# Patient Record
Sex: MALE | Race: WHITE | NOT HISPANIC OR LATINO
[De-identification: names, ages, dates, MRNs, and addresses within clinical notes are randomized per-mention and may not be internally consistent; named-entity substitution may affect disease eponyms.]

---

## 2019-12-22 ENCOUNTER — TRANSCRIPTION ENCOUNTER (OUTPATIENT)
Age: 74
End: 2019-12-22

## 2021-09-22 ENCOUNTER — INPATIENT (INPATIENT)
Facility: HOSPITAL | Age: 76
LOS: 0 days | Discharge: HOME | DRG: 74 | End: 2021-09-23
Attending: HOSPITALIST | Admitting: HOSPITALIST
Payer: MEDICARE

## 2021-09-22 VITALS — HEIGHT: 66 IN | WEIGHT: 199.96 LBS

## 2021-09-22 DIAGNOSIS — I63.9 CEREBRAL INFARCTION, UNSPECIFIED: ICD-10-CM

## 2021-09-22 LAB
ALBUMIN SERPL ELPH-MCNC: 3.8 G/DL — SIGNIFICANT CHANGE UP (ref 3.3–5)
ALP SERPL-CCNC: 81 U/L — SIGNIFICANT CHANGE UP (ref 40–120)
ALT FLD-CCNC: 25 U/L — SIGNIFICANT CHANGE UP (ref 12–78)
ANION GAP SERPL CALC-SCNC: 5 MMOL/L — SIGNIFICANT CHANGE UP (ref 5–17)
APPEARANCE UR: CLEAR — SIGNIFICANT CHANGE UP
APTT BLD: 32 SEC — SIGNIFICANT CHANGE UP (ref 27.5–35.5)
AST SERPL-CCNC: 26 U/L — SIGNIFICANT CHANGE UP (ref 15–37)
BASOPHILS # BLD AUTO: 0.03 K/UL — SIGNIFICANT CHANGE UP (ref 0–0.2)
BASOPHILS NFR BLD AUTO: 0.6 % — SIGNIFICANT CHANGE UP (ref 0–2)
BILIRUB SERPL-MCNC: 0.5 MG/DL — SIGNIFICANT CHANGE UP (ref 0.2–1.2)
BILIRUB UR-MCNC: NEGATIVE — SIGNIFICANT CHANGE UP
BUN SERPL-MCNC: 18 MG/DL — SIGNIFICANT CHANGE UP (ref 7–23)
CALCIUM SERPL-MCNC: 8.7 MG/DL — SIGNIFICANT CHANGE UP (ref 8.5–10.1)
CHLORIDE SERPL-SCNC: 107 MMOL/L — SIGNIFICANT CHANGE UP (ref 96–108)
CO2 SERPL-SCNC: 28 MMOL/L — SIGNIFICANT CHANGE UP (ref 22–31)
COLOR SPEC: YELLOW — SIGNIFICANT CHANGE UP
CREAT SERPL-MCNC: 1.01 MG/DL — SIGNIFICANT CHANGE UP (ref 0.5–1.3)
DIFF PNL FLD: NEGATIVE — SIGNIFICANT CHANGE UP
EOSINOPHIL # BLD AUTO: 0.12 K/UL — SIGNIFICANT CHANGE UP (ref 0–0.5)
EOSINOPHIL NFR BLD AUTO: 2.4 % — SIGNIFICANT CHANGE UP (ref 0–6)
GLUCOSE SERPL-MCNC: 123 MG/DL — HIGH (ref 70–99)
GLUCOSE UR QL: NEGATIVE MG/DL — SIGNIFICANT CHANGE UP
HCT VFR BLD CALC: 44.8 % — SIGNIFICANT CHANGE UP (ref 39–50)
HGB BLD-MCNC: 14.1 G/DL — SIGNIFICANT CHANGE UP (ref 13–17)
IMM GRANULOCYTES NFR BLD AUTO: 0.6 % — SIGNIFICANT CHANGE UP (ref 0–1.5)
INR BLD: 0.96 RATIO — SIGNIFICANT CHANGE UP (ref 0.88–1.16)
KETONES UR-MCNC: NEGATIVE — SIGNIFICANT CHANGE UP
LEUKOCYTE ESTERASE UR-ACNC: NEGATIVE — SIGNIFICANT CHANGE UP
LYMPHOCYTES # BLD AUTO: 1.39 K/UL — SIGNIFICANT CHANGE UP (ref 1–3.3)
LYMPHOCYTES # BLD AUTO: 27.4 % — SIGNIFICANT CHANGE UP (ref 13–44)
MANUAL SMEAR VERIFICATION: SIGNIFICANT CHANGE UP
MCHC RBC-ENTMCNC: 23 PG — LOW (ref 27–34)
MCHC RBC-ENTMCNC: 31.5 GM/DL — LOW (ref 32–36)
MCV RBC AUTO: 73.2 FL — LOW (ref 80–100)
MONOCYTES # BLD AUTO: 0.47 K/UL — SIGNIFICANT CHANGE UP (ref 0–0.9)
MONOCYTES NFR BLD AUTO: 9.3 % — SIGNIFICANT CHANGE UP (ref 2–14)
NEUTROPHILS # BLD AUTO: 3.03 K/UL — SIGNIFICANT CHANGE UP (ref 1.8–7.4)
NEUTROPHILS NFR BLD AUTO: 59.7 % — SIGNIFICANT CHANGE UP (ref 43–77)
NITRITE UR-MCNC: NEGATIVE — SIGNIFICANT CHANGE UP
PH UR: 5 — SIGNIFICANT CHANGE UP (ref 5–8)
PLAT MORPH BLD: NORMAL — SIGNIFICANT CHANGE UP
PLATELET # BLD AUTO: 223 K/UL — SIGNIFICANT CHANGE UP (ref 150–400)
POTASSIUM SERPL-MCNC: 4 MMOL/L — SIGNIFICANT CHANGE UP (ref 3.5–5.3)
POTASSIUM SERPL-SCNC: 4 MMOL/L — SIGNIFICANT CHANGE UP (ref 3.5–5.3)
PROT SERPL-MCNC: 7.3 GM/DL — SIGNIFICANT CHANGE UP (ref 6–8.3)
PROT UR-MCNC: NEGATIVE MG/DL — SIGNIFICANT CHANGE UP
PROTHROM AB SERPL-ACNC: 11.2 SEC — SIGNIFICANT CHANGE UP (ref 10.6–13.6)
RBC # BLD: 6.12 M/UL — HIGH (ref 4.2–5.8)
RBC # FLD: 16.8 % — HIGH (ref 10.3–14.5)
RBC BLD AUTO: SIGNIFICANT CHANGE UP
SODIUM SERPL-SCNC: 140 MMOL/L — SIGNIFICANT CHANGE UP (ref 135–145)
SP GR SPEC: 1.01 — SIGNIFICANT CHANGE UP (ref 1.01–1.02)
TROPONIN I SERPL-MCNC: <0.015 NG/ML — SIGNIFICANT CHANGE UP (ref 0.01–0.04)
UROBILINOGEN FLD QL: NEGATIVE MG/DL — SIGNIFICANT CHANGE UP
WBC # BLD: 5.07 K/UL — SIGNIFICANT CHANGE UP (ref 3.8–10.5)
WBC # FLD AUTO: 5.07 K/UL — SIGNIFICANT CHANGE UP (ref 3.8–10.5)

## 2021-09-22 PROCEDURE — U0003: CPT

## 2021-09-22 PROCEDURE — 86618 LYME DISEASE ANTIBODY: CPT | Mod: 59

## 2021-09-22 PROCEDURE — 93005 ELECTROCARDIOGRAM TRACING: CPT

## 2021-09-22 PROCEDURE — 99285 EMERGENCY DEPT VISIT HI MDM: CPT

## 2021-09-22 PROCEDURE — 84484 ASSAY OF TROPONIN QUANT: CPT

## 2021-09-22 PROCEDURE — 36415 COLL VENOUS BLD VENIPUNCTURE: CPT

## 2021-09-22 PROCEDURE — 70551 MRI BRAIN STEM W/O DYE: CPT

## 2021-09-22 PROCEDURE — 70496 CT ANGIOGRAPHY HEAD: CPT | Mod: 26,MA

## 2021-09-22 PROCEDURE — 70498 CT ANGIOGRAPHY NECK: CPT | Mod: 26,MA

## 2021-09-22 PROCEDURE — 82746 ASSAY OF FOLIC ACID SERUM: CPT

## 2021-09-22 PROCEDURE — U0005: CPT

## 2021-09-22 PROCEDURE — 82607 VITAMIN B-12: CPT

## 2021-09-22 PROCEDURE — 71046 X-RAY EXAM CHEST 2 VIEWS: CPT | Mod: 26

## 2021-09-22 RX ORDER — VALACYCLOVIR 500 MG/1
1000 TABLET, FILM COATED ORAL ONCE
Refills: 0 | Status: COMPLETED | OUTPATIENT
Start: 2021-09-22 | End: 2021-09-22

## 2021-09-22 RX ORDER — GLUCOSAMINE/CHONDROITIN/C/MANG 500-400 MG
1 CAPSULE ORAL
Qty: 0 | Refills: 0 | DISCHARGE

## 2021-09-22 RX ORDER — ASPIRIN/CALCIUM CARB/MAGNESIUM 324 MG
81 TABLET ORAL ONCE
Refills: 0 | Status: COMPLETED | OUTPATIENT
Start: 2021-09-22 | End: 2021-09-22

## 2021-09-22 RX ORDER — CHOLECALCIFEROL (VITAMIN D3) 125 MCG
1 CAPSULE ORAL
Qty: 0 | Refills: 0 | DISCHARGE

## 2021-09-22 RX ADMIN — Medication 81 MILLIGRAM(S): at 18:53

## 2021-09-22 RX ADMIN — Medication 40 MILLIGRAM(S): at 22:00

## 2021-09-22 RX ADMIN — VALACYCLOVIR 1000 MILLIGRAM(S): 500 TABLET, FILM COATED ORAL at 22:01

## 2021-09-22 NOTE — ED STATDOCS - ATTENDING CONTRIBUTION TO CARE
I, Booker Valdez, ED Attending,  performed the initial face to face bedside interview with this patient regarding history of present illness, review of symptoms and relevant past medical, social and family history.  I completed an independent physical examination.  I was the initial provider who evaluated this patient. I have signed out the follow up of any pending tests (i.e. labs, radiological studies) to the ACP.  I have communicated the patient’s plan of care and disposition with the ACP.  The history, relevant review of systems, past medical and surgical history, medical decision making, and physical examination was documented by the scribe in my presence and I attest to the accuracy of the documentation.

## 2021-09-22 NOTE — ED STATDOCS - PROGRESS NOTE DETAILS
75 y/o Male with HLD presents to ED c/o noticing funny sensation, like "novacaine wearing off", to right side of muoth / lips that started at 3pm yesterday.  Neg HTn, CAD, TIA, CVA in the past.  Neg associated HA, pain to one side of face, nausea, vomiting, rash, weakness to one side of body, unsteady gait.  Pt does report that he speech sounded garbles yesterday, but better today.  On exam, PERRLA, EOMS intact s nystagmus, Oropharynx pink s lesions.  (+) Mild decrease in naso-labial fold to left side of face.  Unable to blow off cheek on left side or show teeth on left side of face.  Sens intact to bilat face.  Speech clear.  Pt can raise eyebrow bilat, but R > left side.  Pt with difficulty closing left eye.  Discussed case with Dr. Lazaro.  Will FT her with pt when he returns from Imaging.  Sherron Rhodes PA-C DR. Lazaro performed a Consult over FT with patient in ED.  She cannot r/o CVA at this time.  Recommends that pt stay for MRI and official Neuro Consult in the morning.  She also said to start Prednisone and Valtrex as precaution.  left message for Hospitalist to call back for Admit / OBS.  Sherron Rhodes PA-C

## 2021-09-22 NOTE — ED STATDOCS - CLINICAL SUMMARY MEDICAL DECISION MAKING FREE TEXT BOX
sx more consistent with dysarthria and possible facial droop, outside window for code stroke as last known normal was 3PM yesterday, plan for stroke and infectious workup and neuro consult sx more consistent with dysarthria and possible facial droop, outside window for code stroke as last known normal was 3PM yesterday, plan for stroke and infectious workup and neuro consult    JAMIN Valdez, Attending: reviewed note.    ?R sided facial numbness and lip "sloppiness". Sx started 27-28 hours prior to presentation. No falls, trauma, pain or hx of CVA. Mother does have hx of bells palsy. Does report increased sensitivity to certain foods?    On exam, smile is asymmetrical. Appears LEFT corner of mouth elevating. NO obvious involvement of forehead. 5/5 strength in extremities. Normal memory. ?slight dysarthria.  Not code stroke due to not candidate for any immediate intervention. ASA ordered. Suspect atypical bells vs small CVA. Neuroimaging, neuro c/s and admission. sx more consistent with dysarthria and possible facial droop, outside window for code stroke as last known normal was 3PM yesterday, plan for stroke and infectious workup and neuro consult    JAMIN Valdez, Attending: reviewed note.    ?R sided facial numbness and lip "sloppiness". Sx started 27-28 hours prior to presentation. No falls, trauma, pain or hx of CVA. Mother does have hx of bells palsy. Does report increased sensitivity to certain foods?    On exam, smile is asymmetrical. Appears LEFT corner of mouth NOT elevating. NO obvious involvement of forehead. 5/5 strength in extremities. Normal memory. ?slight dysarthria.  Not code stroke due to not candidate for any immediate intervention. ASA ordered. Suspect atypical bells vs small CVA. Neuroimaging, neuro c/s and admission.

## 2021-09-22 NOTE — ED STATDOCS - OBJECTIVE STATEMENT
75 y/o male with no pertinent PMHx presents to ED c/o right sided facial numbness since yesterday. States yesterday he first noted numbness to right side of mouth and inability to control right side of mouth and then proceeded with right sided facial numbness. States recently he has increased sensitivity to bananas and strawberries. Denies any changes to extremities. Denies any trauma, no chest pain, no neck pain. Denies fevers, cough, sore throat. Vaccinated against COVID. 77 y/o male with no pertinent PMHx presents to ED c/o right sided facial numbness since yesterday. States yesterday he first noted numbness to right side of mouth and inability to control right side of mouth around 3PM and then proceeded with right sided facial numbness. States sx are improving. States he has increased sensitivity to bananas and strawberries. Denies any changes to extremities. Denies any trauma, no chest pain, no neck pain. Denies fevers, cough, sore throat. Vaccinated against COVID. PMD: Dr. Altamirano. 77 y/o male with no pertinent PMHx presents to ED c/o right sided facial numbness since yesterday. States yesterday at 3PM he first noted numbness to right side of mouth and inability to control right side of mouth and then proceeded with right sided facial numbness. States sx are improving. States he has increased sensitivity to bananas and strawberries. Denies any changes to extremities. Denies any trauma, no chest pain, no neck pain. Denies fevers, cough, sore throat. Vaccinated against COVID. PMD: Dr. Altamirano.

## 2021-09-22 NOTE — ED STATDOCS - NEUROLOGICAL, MLM
sensation is normal and strength is normal. sensation is normal and strength is normal. +asymmetrical smile, sparing of paralysis of forehead, ?dysarthria, no pronator drift, 5/5 strength in all extremities

## 2021-09-22 NOTE — ED ADULT TRIAGE NOTE - CHIEF COMPLAINT QUOTE
patient c/o facial swelling since last night.  patient reports onset of right sided facial swelling and intermittent numbness last night.  patient reports ice helped for a while.  patient reports times where whole mouth felt swollen. denies injury to area.  BEFAST negative.

## 2021-09-23 ENCOUNTER — TRANSCRIPTION ENCOUNTER (OUTPATIENT)
Age: 76
End: 2021-09-23

## 2021-09-23 VITALS
DIASTOLIC BLOOD PRESSURE: 63 MMHG | OXYGEN SATURATION: 98 % | RESPIRATION RATE: 18 BRPM | HEART RATE: 72 BPM | SYSTOLIC BLOOD PRESSURE: 136 MMHG | TEMPERATURE: 98 F

## 2021-09-23 LAB
B BURGDOR C6 AB SER-ACNC: NEGATIVE — SIGNIFICANT CHANGE UP
B BURGDOR IGG+IGM SER-ACNC: 0.1 INDEX — SIGNIFICANT CHANGE UP (ref 0.01–0.89)
COVID-19 SPIKE DOMAIN AB INTERP: POSITIVE
COVID-19 SPIKE DOMAIN ANTIBODY RESULT: >250 U/ML — HIGH
FOLATE SERPL-MCNC: 12.9 NG/ML — SIGNIFICANT CHANGE UP
SARS-COV-2 IGG+IGM SERPL QL IA: >250 U/ML — HIGH
SARS-COV-2 IGG+IGM SERPL QL IA: POSITIVE
SARS-COV-2 RNA SPEC QL NAA+PROBE: SIGNIFICANT CHANGE UP
TROPONIN I SERPL-MCNC: <0.015 NG/ML — SIGNIFICANT CHANGE UP (ref 0.01–0.04)
TROPONIN I SERPL-MCNC: <0.015 NG/ML — SIGNIFICANT CHANGE UP (ref 0.01–0.04)
VIT B12 SERPL-MCNC: 280 PG/ML — SIGNIFICANT CHANGE UP (ref 232–1245)

## 2021-09-23 PROCEDURE — 70551 MRI BRAIN STEM W/O DYE: CPT | Mod: 26

## 2021-09-23 PROCEDURE — 99223 1ST HOSP IP/OBS HIGH 75: CPT

## 2021-09-23 PROCEDURE — 93010 ELECTROCARDIOGRAM REPORT: CPT

## 2021-09-23 RX ORDER — CX-024414 0.2 MG/ML
0.5 INJECTION, SUSPENSION INTRAMUSCULAR
Qty: 0 | Refills: 0 | DISCHARGE

## 2021-09-23 RX ORDER — VALACYCLOVIR 500 MG/1
1000 TABLET, FILM COATED ORAL THREE TIMES A DAY
Refills: 0 | Status: DISCONTINUED | OUTPATIENT
Start: 2021-09-23 | End: 2021-09-23

## 2021-09-23 RX ORDER — ASPIRIN/CALCIUM CARB/MAGNESIUM 324 MG
81 TABLET ORAL DAILY
Refills: 0 | Status: DISCONTINUED | OUTPATIENT
Start: 2021-09-24 | End: 2021-09-23

## 2021-09-23 RX ORDER — ATORVASTATIN CALCIUM 80 MG/1
40 TABLET, FILM COATED ORAL AT BEDTIME
Refills: 0 | Status: DISCONTINUED | OUTPATIENT
Start: 2021-09-23 | End: 2021-09-23

## 2021-09-23 RX ORDER — CHOLECALCIFEROL (VITAMIN D3) 125 MCG
1000 CAPSULE ORAL DAILY
Refills: 0 | Status: DISCONTINUED | OUTPATIENT
Start: 2021-09-23 | End: 2021-09-23

## 2021-09-23 RX ORDER — ENOXAPARIN SODIUM 100 MG/ML
40 INJECTION SUBCUTANEOUS DAILY
Refills: 0 | Status: DISCONTINUED | OUTPATIENT
Start: 2021-09-23 | End: 2021-09-23

## 2021-09-23 RX ORDER — INFLUENZA VIRUS VACCINE 15; 15; 15; 15 UG/.5ML; UG/.5ML; UG/.5ML; UG/.5ML
0.5 SUSPENSION INTRAMUSCULAR ONCE
Refills: 0 | Status: DISCONTINUED | OUTPATIENT
Start: 2021-09-23 | End: 2021-09-23

## 2021-09-23 RX ADMIN — Medication 1000 UNIT(S): at 09:59

## 2021-09-23 RX ADMIN — ENOXAPARIN SODIUM 40 MILLIGRAM(S): 100 INJECTION SUBCUTANEOUS at 09:59

## 2021-09-23 NOTE — CONSULT NOTE ADULT - ASSESSMENT
77 yo man with left facial weakness.  What he thought was right facial swelling was likely relative to flattening of left face.  Forehead minimally affected.  Likely Bell's Palsy, but since forehead is not clearly affected, will get MRI brain. Spoke to MRI and this will be done this AM.  Received valacyclovir and Prednisone x 1. If MRI negative for stroke, would continue prednisone taper and valacyclovir.  Lubricating eye drops/gel to left eye.  Patching of left eye at night  if unable to fully close.  Check Lyme and ACE levels.  If MRI is positive for stroke will get echocardiogram.    Will follow up results of MRI.  Discussed with Dr. D'Amico

## 2021-09-23 NOTE — H&P ADULT - HISTORY OF PRESENT ILLNESS
CC:  Patient is a 76y old  Male who presents with a chief complaint of L facial weakness.    HPI:  76M.  presented from home to ED on 09/22/2021 c/o L facial weakness.  onset 09/21.  a/w sensation of "swelling" and mild difficulty w/ speech.  He was kept for observation as it was unclear if his forehead was spared suggesting stroke rather than Bell's Palsy.    in ED, NIHSS:  1.  initial neuroimaging negative for acute findings.  EKG sinus.  given ASA, valacyclovir and prednisone.  MR brain pending.    ROS:      all other review of systems are negative unless indicated above.    PAST MEDICAL & SURGICAL HISTORY:      Allergies    Allergy Status Unknown    Intolerances        Home Medications:  Artificial Tears ophthalmic solution: 1 drop(s) to each affected eye 2 times a day, As Needed - for dry eyes (22 Sep 2021 23:40)  Glucosamine Chondroitin oral capsule: 1 cap(s) orally once a day (22 Sep 2021 23:40)  Moderna COVID-19 Vaccine  mcg/0.5 mL intramuscular suspension: 0.5 milliliter(s) intramuscular once  ***2nd dose 4/6*** (22 Sep 2021 23:40)  Vitamin D3 25 mcg (1000 intl units) oral tablet: 1 tab(s) orally once a day (22 Sep 2021 23:40)      Social History:      FAMILY HISTORY:      Vital Signs Last 24 Hrs  T(C): 36.6 (23 Sep 2021 07:14), Max: 37.1 (22 Sep 2021 18:23)  T(F): 97.8 (23 Sep 2021 07:14), Max: 98.7 (22 Sep 2021 18:23)  HR: 64 (23 Sep 2021 07:14) (58 - 64)  BP: 118/79 (23 Sep 2021 07:14) (110/77 - 136/67)  BP(mean): 89 (23 Sep 2021 07:14) (89 - 89)  RR: 16 (23 Sep 2021 07:14) (16 - 18)  SpO2: 94% (23 Sep 2021 07:14) (94% - 96%)    Constitutional: NAD.   HEENT: PERRL, EOMI, MMM.  Neck: Soft and supple, No carotid bruit, No JVD  Respiratory: Breath sounds are clear bilaterally, No wheezing, rales or rhonchi  Cardiovascular: S1 and S2, regular rate and rhythm, no murmur, rub or gallop.  Gastrointestinal: Bowel Sounds present, soft, nontender, nondistended, no guarding, no rebound, no mass.  Extremities: No peripheral edema  Vascular: 2+ peripheral pulses  Neurological: A/O x 3,   facial sensation normal, Left facial weakness. More prominent on lower face. Forehead on left minimally affected. Blinking slower on left more than right but can fully close left eye  Musculoskeletal: 5/5 strength b/l upper and lower extremities  Skin:  no visible rashes.                             14.1   5.07  )-----------( 223      ( 22 Sep 2021 19:20 )             44.8       PT/INR - ( 22 Sep 2021 19:20 )   PT: 11.2 sec;   INR: 0.96 ratio         PTT - ( 22 Sep 2021 19:20 )  PTT:32.0 sec    09-22    140  |  107  |  18  ----------------------------<  123<H>  4.0   |  28  |  1.01    Ca    8.7      22 Sep 2021 19:20    TPro  7.3  /  Alb  3.8  /  TBili  0.5  /  DBili  x   /  AST  26  /  ALT  25  /  AlkPhos  81  09-22      LIVER FUNCTIONS - ( 22 Sep 2021 19:20 )  Alb: 3.8 g/dL / Pro: 7.3 gm/dL / ALK PHOS: 81 U/L / ALT: 25 U/L / AST: 26 U/L / GGT: x             CARDIAC MARKERS ( 23 Sep 2021 02:03 )  <0.015 ng/mL / x     / x     / x     / x      CARDIAC MARKERS ( 22 Sep 2021 23:20 )  <0.015 ng/mL / x     / x     / x     / x      CARDIAC MARKERS ( 22 Sep 2021 19:20 )  <0.015 ng/mL / x     / x     / x     / x

## 2021-09-23 NOTE — H&P ADULT - ASSESSMENT
76M.  presented from home to ED on 09/22/2021 c/o L facial weakness.    L facial weakness - DDx:  Bell's Palsy;  CVA.    - admit to:  telemetry [facility].  - labs:  A1C;  lipid panel;  B12/FA;  Lyme;  ACE levels.  - imaging:  follow MR brain.  - echocardiogram:  if MR (+) for CVA.  hold for now.  - AP:  ASA 81mg po qd.  - ST:  atorvastatin 40mg po qhs.  - PT.  - DVT prophylaxis:  LMWH.  - dysphagia screening and speech pathology evaluation.  - if no evidence for CVA, will start:  valacyclovir + prednisone.  - disposition:  plan to DC home if MR negative for CVA.  continue to 3N for now.  - communication:  ED RN;  Neurology.

## 2021-09-23 NOTE — DISCHARGE NOTE PROVIDER - CARE PROVIDER_API CALL
Mayte Lazaro (MD)  Clinical Neurophysiology; EEGEpilepsy; Neurology; Sleep Medicine  [address] [facility], [place] [address]  Phone: [phone]  Fax: [phone]  Follow Up Time: 1 week

## 2021-09-23 NOTE — DISCHARGE NOTE PROVIDER - HOSPITAL COURSE
76M.  presented from home to ED on 09/22/2021 c/o L facial weakness.    L facial weakness - DDx:  Bell's Palsy;  CVA.    - admit to:  telemetry [facility].  - labs:  A1C;  lipid panel;  B12/FA;  Lyme;  ACE levels.  - imaging:  follow MR brain.  - echocardiogram:  if MR (+) for CVA.  hold for now.  - AP:  ASA 81mg po qd.  - ST:  atorvastatin 40mg po qhs.  - PT.  - DVT prophylaxis:  LMWH.  - dysphagia screening and speech pathology evaluation.  - if no evidence for CVA, will start:  valacyclovir + prednisone.  - disposition:  plan to DC home if MR negative for CVA.  continue to 3N for now.  - communication:  ED RN;  Neurology.      Electronic Signatures for Addendum Section:   D'Amico, Thomas J (DO) (Signed Addendum 23-Sep-2021 13:11)    informed by RN MR was negative for stroke.  awaiting for official report.    anticipate DC home after above.  - prednisone 60mg po qd x 7 days.  - valacyclovir 1000mg po tid x 7 days.

## 2021-09-23 NOTE — ED ADULT NURSE REASSESSMENT NOTE - NS ED NURSE REASSESS COMMENT FT1
RN reached out to hospitalist on admit phone-spoke with Dr. Gomez. Pt asking when his "MRI will be". MRI never ordered by ER doc or neurologist although it is mentioned in previous notes. Pt is currently pending admission orders. RN requesting order be placed for MRI. Per Dr. Gomez "Pt will be seen by next shift in half an hour". No order placed.

## 2021-09-23 NOTE — DISCHARGE NOTE NURSING/CASE MANAGEMENT/SOCIAL WORK - PATIENT PORTAL LINK FT
You can access the FollowMyHealth Patient Portal offered by [facility] by registering at the following website: [email or web address]. By joining [facility]’s FollowMyHealth portal, you will also be able to view your health information using other applications (apps) compatible with our system.

## 2021-09-23 NOTE — SWALLOW BEDSIDE ASSESSMENT ADULT - COMMENTS
REQUEST FOR CONSULT RECEIVED. CHART REVIEWED. PT WAS D/C FROM HOSPITAL TODAY PRIOR TO BEING SEEN FOR CONSULT BY SPEECH PATHOLOGY. TURN AROUND TIME FROM THE TIME CONSULT WAS ORDERED TO THE TIME I ATTEMPTED TO SEE PT FOR EXAMINATION WAS LESS THAN 24 HOURS.

## 2021-09-23 NOTE — CONSULT NOTE ADULT - SUBJECTIVE AND OBJECTIVE BOX
Patient is a 76y old  Male who presents with a chief complaint of facial weakness.    HPI: Mr. Vides is a 76 year old man who noticed "right facial swelling" on 9/21. He also reported some difficulty with speech. He felt that he was speaking as if he had been at the dentist.  He also noticed some distortion of taste on 9/22.  He came to the ED on 9/22 and was found to have a right facial weakness.  He was kept for observation as it was unclear if his forehead was spared suggesting stroke rather than Bell's Palsy.  He denies any pain or facial numbness.  He denies any difficulty speaking or swallowing at this time.  He denies weakness in any other areas of the body.      PAST MEDICAL & SURGICAL HISTORY:  Borderline high cholesterol  Right hip replacement  Cholecystectomy      FAMILY HISTORY:  Mother - Bell's Palsy  Family history of alcoholism      Social Hx:  Nonsmoker, no drug or alcohol use    MEDICATIONS  (STANDING):  Vitamin D     Allergies    Allergy Status Unknown    Intolerances        ROS: Pertinent positives in HPI, all other ROS were reviewed and are negative.      Vital Signs Last 24 Hrs  T(C): 36.6 (23 Sep 2021 07:14), Max: 37.1 (22 Sep 2021 18:23)  T(F): 97.8 (23 Sep 2021 07:14), Max: 98.7 (22 Sep 2021 18:23)  HR: 64 (23 Sep 2021 07:14) (58 - 64)  BP: 118/79 (23 Sep 2021 07:14) (110/77 - 136/67)  BP(mean): 89 (23 Sep 2021 07:14) (89 - 89)  RR: 16 (23 Sep 2021 07:14) (16 - 18)  SpO2: 94% (23 Sep 2021 07:14) (94% - 96%)        Constitutional: awake and alert.  HEENT: PERRLA, EOMI,   Neck: Supple.  Respiratory: Breath sounds are clear bilaterally  Cardiovascular: S1 and S2, regular / irregular rhythm  Gastrointestinal: soft, nontender  Extremities:  no edema  Musculoskeletal: no joint swelling/tenderness, no abnormal movements  Skin: No rashes    Neurological exam:  HF: A x O x 3. Appropriately interactive, normal affect. Speech fluent, No Aphasia or paraphasic errors. Naming /repetition intact   CN: WILDER, EOMI, VFF, facial sensation normal, Left facial weakness. More prominent on lower face. Forehead on left minimally affected. Blinking slower on left more than right but can fully close left eyeNo NLFD, tongue midline, Palate moves equally, SCM equal bilaterally  Motor: No pronator drift, Strength 5/5 in all 4 ext, normal bulk and tone, no tremor, rigidity or bradykinesia.    Sens: Intact to light touch   Reflexes: Symmetric and normal . BJ 2+, BR 2+, KJ 2+, AJ 2+, downgoing toes b/l  Coord:  No FNFA, dysmetria, DIANA intact   Gait/Balance: Narrow, steady    NIHSS: 1          Labs:   09-22    140  |  107  |  18  ----------------------------<  123<H>  4.0   |  28  |  1.01    Ca    8.7      22 Sep 2021 19:20    TPro  7.3  /  Alb  3.8  /  TBili  0.5  /  DBili  x   /  AST  26  /  ALT  25  /  AlkPhos  81  09-22                              14.1   5.07  )-----------( 223      ( 22 Sep 2021 19:20 )             44.8       Radiology:    CT brain/CTA head and neck 9/22/21:    CT BRAIN:  No acute intracranial hemorrhage, mass effect, or CT evidence of an acute vascular territorial infarct. Mild chronic ischemic changes in the frontoparietal white matter.    CTA NECK:  No significant stenosis of the cervical carotid arteries based on NASCET criteria. Patent cervical vertebral arteries. No evidence of cervical carotid or vertebral artery dissection.    CTA HEAD:  No high-grade stenosis or occlusion of the major proximal arterial branches.

## 2021-09-23 NOTE — DISCHARGE NOTE PROVIDER - DISCHARGE DIET
----- Message from Flower Carpenter PA-C sent at 2/1/2020  6:51 AM CST -----  Please notify pt that thyroid test was normal     Regular Diet - No restrictions

## 2021-09-24 ENCOUNTER — TRANSCRIPTION ENCOUNTER (OUTPATIENT)
Age: 76
End: 2021-09-24

## 2021-09-24 LAB
CULTURE RESULTS: SIGNIFICANT CHANGE UP
SPECIMEN SOURCE: SIGNIFICANT CHANGE UP

## 2021-09-24 RX ORDER — VALACYCLOVIR 500 MG/1
1 TABLET, FILM COATED ORAL
Qty: 21 | Refills: 0
Start: 2021-09-24 | End: 2021-09-30

## 2021-09-28 DIAGNOSIS — Z96.641 PRESENCE OF RIGHT ARTIFICIAL HIP JOINT: ICD-10-CM

## 2021-09-28 DIAGNOSIS — G51.0 BELL'S PALSY: ICD-10-CM

## 2021-09-28 DIAGNOSIS — R29.810 FACIAL WEAKNESS: ICD-10-CM

## 2021-10-07 ENCOUNTER — TRANSCRIPTION ENCOUNTER (OUTPATIENT)
Age: 76
End: 2021-10-07

## 2021-11-01 PROBLEM — Z78.9 OTHER SPECIFIED HEALTH STATUS: Chronic | Status: ACTIVE | Noted: 2021-09-24

## 2021-11-10 ENCOUNTER — OUTPATIENT (OUTPATIENT)
Dept: OUTPATIENT SERVICES | Facility: HOSPITAL | Age: 76
LOS: 1 days | End: 2021-11-10

## 2021-11-28 ENCOUNTER — APPOINTMENT (OUTPATIENT)
Dept: DISASTER EMERGENCY | Facility: CLINIC | Age: 76
End: 2021-11-28

## 2021-11-28 DIAGNOSIS — Z01.818 ENCOUNTER FOR OTHER PREPROCEDURAL EXAMINATION: ICD-10-CM

## 2021-11-29 ENCOUNTER — APPOINTMENT (OUTPATIENT)
Dept: NEUROLOGY | Facility: CLINIC | Age: 76
End: 2021-11-29

## 2021-11-29 LAB — SARS-COV-2 N GENE NPH QL NAA+PROBE: NOT DETECTED

## 2021-11-30 ENCOUNTER — APPOINTMENT (OUTPATIENT)
Dept: NEUROLOGY | Facility: CLINIC | Age: 76
End: 2021-11-30

## 2021-12-01 ENCOUNTER — OUTPATIENT (OUTPATIENT)
Dept: OUTPATIENT SERVICES | Facility: HOSPITAL | Age: 76
LOS: 1 days | End: 2021-12-01

## 2021-12-02 ENCOUNTER — OUTPATIENT (OUTPATIENT)
Dept: OUTPATIENT SERVICES | Facility: HOSPITAL | Age: 76
LOS: 1 days | End: 2021-12-02

## 2022-10-06 ENCOUNTER — NON-APPOINTMENT (OUTPATIENT)
Age: 77
End: 2022-10-06

## 2022-12-14 ENCOUNTER — NON-APPOINTMENT (OUTPATIENT)
Age: 77
End: 2022-12-14

## 2023-03-29 NOTE — DISCHARGE NOTE PROVIDER - NSDCMRMEDTOKEN_GEN_ALL_CORE_FT
stated
Artificial Tears ophthalmic solution: 1 drop(s) to each affected eye 2 times a day, As Needed - for dry eyes  Glucosamine Chondroitin oral capsule: 1 cap(s) orally once a day  predniSONE 20 mg oral tablet: 3 tab(s) orally once a day  valACYclovir 1 g oral tablet: 1 tab(s) orally 3 times a day  Vitamin D3 25 mcg (1000 intl units) oral tablet: 1 tab(s) orally once a day

## 2023-04-03 ENCOUNTER — NON-APPOINTMENT (OUTPATIENT)
Age: 78
End: 2023-04-03

## 2024-01-21 ENCOUNTER — EMERGENCY (EMERGENCY)
Facility: HOSPITAL | Age: 79
LOS: 0 days | Discharge: HOME | End: 2024-01-21
Attending: EMERGENCY MEDICINE
Payer: MEDICARE

## 2024-01-21 VITALS
DIASTOLIC BLOOD PRESSURE: 74 MMHG | SYSTOLIC BLOOD PRESSURE: 122 MMHG | RESPIRATION RATE: 16 BRPM | TEMPERATURE: 98 F | OXYGEN SATURATION: 98 % | HEART RATE: 68 BPM

## 2024-01-21 VITALS — HEIGHT: 67 IN | WEIGHT: 199.96 LBS

## 2024-01-21 DIAGNOSIS — R10.32 LEFT LOWER QUADRANT PAIN: ICD-10-CM

## 2024-01-21 DIAGNOSIS — Z90.49 ACQUIRED ABSENCE OF OTHER SPECIFIED PARTS OF DIGESTIVE TRACT: ICD-10-CM

## 2024-01-21 DIAGNOSIS — Z87.442 PERSONAL HISTORY OF URINARY CALCULI: ICD-10-CM

## 2024-01-21 DIAGNOSIS — R31.9 HEMATURIA, UNSPECIFIED: ICD-10-CM

## 2024-01-21 LAB
ALBUMIN SERPL ELPH-MCNC: 4.2 G/DL — SIGNIFICANT CHANGE UP (ref 3.3–5)
ALP SERPL-CCNC: 80 U/L — SIGNIFICANT CHANGE UP (ref 40–120)
ALT FLD-CCNC: 24 U/L — SIGNIFICANT CHANGE UP (ref 12–78)
ANION GAP SERPL CALC-SCNC: 0 MMOL/L — LOW (ref 5–17)
ANISOCYTOSIS BLD QL: SLIGHT — SIGNIFICANT CHANGE UP
APPEARANCE UR: CLEAR — SIGNIFICANT CHANGE UP
AST SERPL-CCNC: 20 U/L — SIGNIFICANT CHANGE UP (ref 15–37)
BACTERIA # UR AUTO: NEGATIVE /HPF — SIGNIFICANT CHANGE UP
BASOPHILS # BLD AUTO: 0.04 K/UL — SIGNIFICANT CHANGE UP (ref 0–0.2)
BASOPHILS NFR BLD AUTO: 0.5 % — SIGNIFICANT CHANGE UP (ref 0–2)
BILIRUB SERPL-MCNC: 0.6 MG/DL — SIGNIFICANT CHANGE UP (ref 0.2–1.2)
BILIRUB UR-MCNC: NEGATIVE — SIGNIFICANT CHANGE UP
BUN SERPL-MCNC: 19 MG/DL — SIGNIFICANT CHANGE UP (ref 7–23)
CALCIUM SERPL-MCNC: 9.8 MG/DL — SIGNIFICANT CHANGE UP (ref 8.5–10.1)
CAST: 0 /LPF — SIGNIFICANT CHANGE UP (ref 0–4)
CHLORIDE SERPL-SCNC: 108 MMOL/L — SIGNIFICANT CHANGE UP (ref 96–108)
CO2 SERPL-SCNC: 33 MMOL/L — HIGH (ref 22–31)
COLOR SPEC: YELLOW — SIGNIFICANT CHANGE UP
CREAT SERPL-MCNC: 1.14 MG/DL — SIGNIFICANT CHANGE UP (ref 0.5–1.3)
DIFF PNL FLD: ABNORMAL
EGFR: 66 ML/MIN/1.73M2 — SIGNIFICANT CHANGE UP
ELLIPTOCYTES BLD QL SMEAR: SLIGHT — SIGNIFICANT CHANGE UP
EOSINOPHIL # BLD AUTO: 0.12 K/UL — SIGNIFICANT CHANGE UP (ref 0–0.5)
EOSINOPHIL NFR BLD AUTO: 1.5 % — SIGNIFICANT CHANGE UP (ref 0–6)
GLUCOSE SERPL-MCNC: 100 MG/DL — HIGH (ref 70–99)
GLUCOSE UR QL: NEGATIVE MG/DL — SIGNIFICANT CHANGE UP
HCT VFR BLD CALC: 47.5 % — SIGNIFICANT CHANGE UP (ref 39–50)
HGB BLD-MCNC: 15 G/DL — SIGNIFICANT CHANGE UP (ref 13–17)
IMM GRANULOCYTES NFR BLD AUTO: 0.4 % — SIGNIFICANT CHANGE UP (ref 0–0.9)
KETONES UR-MCNC: NEGATIVE MG/DL — SIGNIFICANT CHANGE UP
LEUKOCYTE ESTERASE UR-ACNC: NEGATIVE — SIGNIFICANT CHANGE UP
LIDOCAIN IGE QN: 41 U/L — SIGNIFICANT CHANGE UP (ref 13–75)
LYMPHOCYTES # BLD AUTO: 1.55 K/UL — SIGNIFICANT CHANGE UP (ref 1–3.3)
LYMPHOCYTES # BLD AUTO: 19.5 % — SIGNIFICANT CHANGE UP (ref 13–44)
MANUAL SMEAR VERIFICATION: SIGNIFICANT CHANGE UP
MCHC RBC-ENTMCNC: 23.2 PG — LOW (ref 27–34)
MCHC RBC-ENTMCNC: 31.6 GM/DL — LOW (ref 32–36)
MCV RBC AUTO: 73.4 FL — LOW (ref 80–100)
MICROCYTES BLD QL: SIGNIFICANT CHANGE UP
MONOCYTES # BLD AUTO: 0.76 K/UL — SIGNIFICANT CHANGE UP (ref 0–0.9)
MONOCYTES NFR BLD AUTO: 9.5 % — SIGNIFICANT CHANGE UP (ref 2–14)
NEUTROPHILS # BLD AUTO: 5.46 K/UL — SIGNIFICANT CHANGE UP (ref 1.8–7.4)
NEUTROPHILS NFR BLD AUTO: 68.6 % — SIGNIFICANT CHANGE UP (ref 43–77)
NITRITE UR-MCNC: NEGATIVE — SIGNIFICANT CHANGE UP
OVALOCYTES BLD QL SMEAR: SLIGHT — SIGNIFICANT CHANGE UP
PH UR: 5.5 — SIGNIFICANT CHANGE UP (ref 5–8)
PLAT MORPH BLD: NORMAL — SIGNIFICANT CHANGE UP
PLATELET # BLD AUTO: 222 K/UL — SIGNIFICANT CHANGE UP (ref 150–400)
PLATELET COUNT - ESTIMATE: NORMAL — SIGNIFICANT CHANGE UP
POIKILOCYTOSIS BLD QL AUTO: SLIGHT — SIGNIFICANT CHANGE UP
POTASSIUM SERPL-MCNC: 4.4 MMOL/L — SIGNIFICANT CHANGE UP (ref 3.5–5.3)
POTASSIUM SERPL-SCNC: 4.4 MMOL/L — SIGNIFICANT CHANGE UP (ref 3.5–5.3)
PROT SERPL-MCNC: 7.8 GM/DL — SIGNIFICANT CHANGE UP (ref 6–8.3)
PROT UR-MCNC: NEGATIVE MG/DL — SIGNIFICANT CHANGE UP
RBC # BLD: 6.47 M/UL — HIGH (ref 4.2–5.8)
RBC # FLD: 16.6 % — HIGH (ref 10.3–14.5)
RBC BLD AUTO: ABNORMAL
RBC CASTS # UR COMP ASSIST: 11 /HPF — HIGH (ref 0–4)
SODIUM SERPL-SCNC: 141 MMOL/L — SIGNIFICANT CHANGE UP (ref 135–145)
SP GR SPEC: 1 — SIGNIFICANT CHANGE UP (ref 1–1.03)
SQUAMOUS # UR AUTO: 0 /HPF — SIGNIFICANT CHANGE UP (ref 0–5)
STOMATOCYTES BLD QL SMEAR: SLIGHT — SIGNIFICANT CHANGE UP
UROBILINOGEN FLD QL: 0.2 MG/DL — SIGNIFICANT CHANGE UP (ref 0.2–1)
WBC # BLD: 7.96 K/UL — SIGNIFICANT CHANGE UP (ref 3.8–10.5)
WBC # FLD AUTO: 7.96 K/UL — SIGNIFICANT CHANGE UP (ref 3.8–10.5)
WBC UR QL: 1 /HPF — SIGNIFICANT CHANGE UP (ref 0–5)

## 2024-01-21 PROCEDURE — 74177 CT ABD & PELVIS W/CONTRAST: CPT | Mod: 26,ME

## 2024-01-21 PROCEDURE — 80053 COMPREHEN METABOLIC PANEL: CPT

## 2024-01-21 PROCEDURE — 81001 URINALYSIS AUTO W/SCOPE: CPT

## 2024-01-21 PROCEDURE — 36415 COLL VENOUS BLD VENIPUNCTURE: CPT

## 2024-01-21 PROCEDURE — 99284 EMERGENCY DEPT VISIT MOD MDM: CPT | Mod: 25

## 2024-01-21 PROCEDURE — 99285 EMERGENCY DEPT VISIT HI MDM: CPT

## 2024-01-21 PROCEDURE — 96374 THER/PROPH/DIAG INJ IV PUSH: CPT | Mod: XU

## 2024-01-21 PROCEDURE — G1004: CPT

## 2024-01-21 PROCEDURE — 87086 URINE CULTURE/COLONY COUNT: CPT

## 2024-01-21 PROCEDURE — 83690 ASSAY OF LIPASE: CPT

## 2024-01-21 PROCEDURE — 85025 COMPLETE CBC W/AUTO DIFF WBC: CPT

## 2024-01-21 PROCEDURE — 74177 CT ABD & PELVIS W/CONTRAST: CPT | Mod: ME

## 2024-01-21 RX ORDER — KETOROLAC TROMETHAMINE 30 MG/ML
15 SYRINGE (ML) INJECTION ONCE
Refills: 0 | Status: DISCONTINUED | OUTPATIENT
Start: 2024-01-21 | End: 2024-01-21

## 2024-01-21 RX ORDER — SODIUM CHLORIDE 9 MG/ML
1000 INJECTION INTRAMUSCULAR; INTRAVENOUS; SUBCUTANEOUS ONCE
Refills: 0 | Status: COMPLETED | OUTPATIENT
Start: 2024-01-21 | End: 2024-01-21

## 2024-01-21 RX ADMIN — SODIUM CHLORIDE 2000 MILLILITER(S): 9 INJECTION INTRAMUSCULAR; INTRAVENOUS; SUBCUTANEOUS at 21:53

## 2024-01-21 RX ADMIN — Medication 15 MILLIGRAM(S): at 21:53

## 2024-01-21 NOTE — ED ADULT NURSE NOTE - OBJECTIVE STATEMENT
pt in ed c/o of L flank pain since this morning. pt reports hematuria.  hx of kidney stones. piv inserted. blood work sent.

## 2024-01-21 NOTE — ED STATDOCS - PATIENT PORTAL LINK FT
You can access the FollowMyHealth Patient Portal offered by [facility] by registering at the following website: [email or web address]. By joining [facility]’s FollowMyHealth portal, you will also be able to view your health information using other applications (apps) compatible with our system.

## 2024-01-21 NOTE — ED STATDOCS - OBJECTIVE STATEMENT
77 y/o male with PMHx of cholecystectomy, kidney stones presents to the ED c/o left flank and LLQ pain that started today around 11:00 associated with hematuria that started around 04:00. Pt notes pain is improved compared to earlier, drank a lot of water.

## 2024-01-21 NOTE — ED STATDOCS - CLINICAL SUMMARY MEDICAL DECISION MAKING FREE TEXT BOX
pt with hx of renal stones p/w left sided abdominal pain, left flank pain and hematuria, likely stone, will check CT, UA, reassess

## 2024-01-21 NOTE — ED ADULT NURSE NOTE - NSFALLUNIVINTERV_ED_ALL_ED
Bed/Stretcher in lowest position, wheels locked, appropriate side rails in place/Call bell, personal items and telephone in reach/Instruct patient to call for assistance before getting out of bed/chair/stretcher/Non-slip footwear applied when patient is off stretcher/[place] to call system/Physically safe environment - no spills, clutter or unnecessary equipment/Purposeful proactive rounding/Room/bathroom lighting operational, light cord in reach

## 2024-01-21 NOTE — ED STATDOCS - NSFOLLOWUPINSTRUCTIONS_ED_ALL_ED_FT
Hematuria, Adult    Hematuria is blood in the urine. Blood may be visible in the urine, or it may be identified with a test. This condition can be caused by infections of the bladder, urethra, kidney, or prostate. Other possible causes include:  Kidney stones.  Cancer of the urinary tract.  Too much calcium in the urine.  Conditions that are passed from parent to child (inherited conditions).  Exercise that requires a lot of energy.  Infections can usually be treated with medicine, and a kidney stone usually will pass through your urine. If neither of these is the cause of your hematuria, more tests may be needed to identify the cause of your symptoms.    It is very important to tell your health care provider about any blood in your urine, even if it is painless or the blood stops without treatment. Blood in the urine, when it happens and then stops and then happens again, can be a symptom of a very serious condition, including cancer. There is no pain in the initial stages of many urinary cancers.    Follow these instructions at home:  Medicines    Take over-the-counter and prescription medicines only as told by your health care provider.  If you were prescribed an antibiotic medicine, take it as told by your health care provider. Do not stop taking the antibiotic even if you start to feel better.  Eating and drinking    Drink enough fluid to keep your urine pale yellow. It is recommended that you drink 3-4 quarts (2.8-3.8 L) a day. If you have been diagnosed with an infection, drinking cranberry juice in addition to large amounts of water is recommended.  Avoid caffeine, tea, and carbonated beverages. These tend to irritate the bladder.  Avoid alcohol because it may irritate the prostate (in males).  General instructions    If you have been diagnosed with a kidney stone, follow your health care provider's instructions about straining your urine to catch the stone.  Empty your bladder often. Avoid holding urine for long periods of time.  If you are female:  After a bowel movement, wipe from front to back and use each piece of toilet paper only once.  Empty your bladder before and after sex.  Pay attention to any changes in your symptoms. Tell your health care provider about any changes or any new symptoms.  It is up to you to get the results of any tests. Ask your health care provider, or the department that is doing the test, when your results will be ready.  Keep all follow-up visits. This is important.  Contact a health care provider if:  You develop back pain.  You have a fever or chills.  You have nausea or vomiting.  Your symptoms do not improve after 3 days.  Your symptoms get worse.  Get help right away if:  You develop severe vomiting and are unable to take medicine without vomiting.  You develop severe pain in your back or abdomen even though you are taking medicine.  You pass a large amount of blood in your urine.  You pass blood clots in your urine.  You feel very weak or like you might faint.  You faint.  Summary  Hematuria is blood in the urine. It has many possible causes.  It is very important that you tell your health care provider about any blood in your urine, even if it is painless or the blood stops without treatment.  Take over-the-counter and prescription medicines only as told by your health care provider.  Drink enough fluid to keep your urine pale yellow.  This information is not intended to replace advice given to you by your health care provider. Make sure you discuss any questions you have with your health care provider.

## 2024-01-21 NOTE — ED STATDOCS - PROGRESS NOTE DETAILS
Symptoms improved after Toradol, CT without acute findings, some blood in the urine, okay for discharge with urology follow-up.

## 2024-01-21 NOTE — ED ADULT TRIAGE NOTE - CHIEF COMPLAINT QUOTE
Pt presents to the ED c/o abdominal pain, back pain and hematuria. Pt reports onset of left sided abdominal pain with back pain was 2 days ago, worsening this morning. Pt reports noticing dark red/orange urine today, with mild burning with urination. Pt reports drinking a lot of fluids today and urine is no longer bright red but pain has persisted. PMHx kidney stones 15yrs ago.

## 2024-01-21 NOTE — ED STATDOCS - CARE PROVIDER_API CALL
Denny Waller Jayjay  Urology  [address] [place] [address], [address]  [place] [address]  Phone: [phone]  Fax: [phone]  Follow Up Time:

## 2024-01-21 NOTE — ED ADULT TRIAGE NOTE - PRO INTERPRETER NEED 2
----- Message from Monica Patel MD sent at 4/10/2020 12:23 PM EDT -----  Regarding: call patient  - schedule follow up with me in 3 months  - remind to come in for lab draw next week  - mail medical records request for for previous GYN and PCP
Outbound call to patient. No answer left message for patient to return call to office. Mailed patient forms for medical records as well as lab reqs.      Patient needs 3 month follow up
English

## 2024-01-21 NOTE — ED ADULT TRIAGE NOTE - [place]AS[place] COMA SCALE: BE[employer] RESPONSE, MLM
(M6) obeys commands Topical Clindamycin Counseling: Patient counseled that this medication may cause skin irritation or allergic reactions.  In the event of skin irritation, the patient was advised to reduce the amount of the drug applied or use it less frequently.   The patient verbalized understanding of the proper use and possible adverse effects of clindamycin.  All of the patient's questions and concerns were addressed.

## 2024-01-21 NOTE — ED STATDOCS - PHYSICAL EXAMINATION

## 2024-01-22 ENCOUNTER — NON-APPOINTMENT (OUTPATIENT)
Age: 79
End: 2024-01-22

## 2024-01-22 ENCOUNTER — APPOINTMENT (OUTPATIENT)
Dept: UROLOGY | Facility: CLINIC | Age: 79
End: 2024-01-22
Payer: MEDICARE

## 2024-01-22 VITALS
BODY MASS INDEX: 31.39 KG/M2 | HEART RATE: 63 BPM | SYSTOLIC BLOOD PRESSURE: 134 MMHG | DIASTOLIC BLOOD PRESSURE: 77 MMHG | HEIGHT: 67 IN | WEIGHT: 200 LBS

## 2024-01-22 DIAGNOSIS — Z78.9 OTHER SPECIFIED HEALTH STATUS: ICD-10-CM

## 2024-01-22 DIAGNOSIS — N13.8 BENIGN PROSTATIC HYPERPLASIA WITH LOWER URINARY TRACT SYMPMS: ICD-10-CM

## 2024-01-22 DIAGNOSIS — N20.1 CALCULUS OF URETER: ICD-10-CM

## 2024-01-22 DIAGNOSIS — N40.1 BENIGN PROSTATIC HYPERPLASIA WITH LOWER URINARY TRACT SYMPMS: ICD-10-CM

## 2024-01-22 DIAGNOSIS — Z83.79 FAMILY HISTORY OF OTHER DISEASES OF THE DIGESTIVE SYSTEM: ICD-10-CM

## 2024-01-22 PROCEDURE — 99203 OFFICE O/P NEW LOW 30 MIN: CPT

## 2024-01-22 RX ORDER — TAMSULOSIN HYDROCHLORIDE 0.4 MG/1
0.4 CAPSULE ORAL
Qty: 90 | Refills: 3 | Status: ACTIVE | COMMUNITY
Start: 2024-01-22 | End: 1900-01-01

## 2024-01-22 RX ORDER — MELATONIN 3 MG
TABLET ORAL
Refills: 0 | Status: ACTIVE | COMMUNITY

## 2024-01-22 RX ORDER — GLUCOSAMINE SULFATE 500 MG
CAPSULE ORAL
Refills: 0 | Status: ACTIVE | COMMUNITY

## 2024-01-22 NOTE — ASSESSMENT
[FreeTextEntry1] : 79 yo M with LEFT ureteral stone. Small and passable. no signs of infection. Will start medical expulsive therapy with tamsulosin. Plan for SHERIF and KUB in 6 weeks. WIll send for metabolic work up with serum assays and 24 hr urine collection. RTO in 6 weeks to review, or sooner PRN

## 2024-01-22 NOTE — HISTORY OF PRESENT ILLNESS
[FreeTextEntry1] : 78 year old M seen 01/22/2024  with complaint of ureteral stone. This began yesterday when the patient presented to  with severe LEFT flank pain. CT report says no stones, but per my read there is mild LEFT hydroureteronephrosis and 2 mm LEFT distal ureteral stone; he was discharged after pain was controlled. Pt reports pain remains but mild and intermittent return. He reports some weak stream and frequency at baseline.  Voiding well. No other complaints at this time.  Pain was severe in severity. Pain medication mad symptoms better, nothing makes sx worse.  It is associated with nothing. No hematuria, no dysuria,  no hesitancy, no straining. No incontinence.  No fevers, no chills, no nausea, no vomiting, no current flank pain.   No family history contributory to kidney stones.

## 2024-01-23 LAB
ANION GAP SERPL CALC-SCNC: 11 MMOL/L
BUN SERPL-MCNC: 21 MG/DL
CALCIUM SERPL-MCNC: 9.7 MG/DL
CALCIUM SERPL-MCNC: 9.7 MG/DL
CHLORIDE SERPL-SCNC: 104 MMOL/L
CO2 SERPL-SCNC: 27 MMOL/L
CREAT SERPL-MCNC: 1.57 MG/DL
CULTURE RESULTS: NO GROWTH — SIGNIFICANT CHANGE UP
EGFR: 45 ML/MIN/1.73M2
GLUCOSE SERPL-MCNC: 85 MG/DL
PARATHYROID HORMONE INTACT: 55 PG/ML
POTASSIUM SERPL-SCNC: 4.6 MMOL/L
SODIUM SERPL-SCNC: 142 MMOL/L
SPECIMEN SOURCE: SIGNIFICANT CHANGE UP
TSH SERPL-ACNC: 1.59 UIU/ML
URATE SERPL-MCNC: 7.3 MG/DL

## 2024-03-19 ENCOUNTER — APPOINTMENT (OUTPATIENT)
Dept: UROLOGY | Facility: CLINIC | Age: 79
End: 2024-03-19

## 2024-07-19 ENCOUNTER — NON-APPOINTMENT (OUTPATIENT)
Age: 79
End: 2024-07-19

## 2025-02-13 ENCOUNTER — NON-APPOINTMENT (OUTPATIENT)
Age: 80
End: 2025-02-13